# Patient Record
(demographics unavailable — no encounter records)

---

## 2024-12-15 NOTE — DISCUSSION/SUMMARY
[All Questions Answered] : Patient (and family) had all questions answered to an agreeable level of satisfaction [Interested in Proceeding] : Patient (and family) expressed understanding and interest in proceeding with the plan as outlined [de-identified] : There are two issues present. 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 It does not looks aggressive and we can follow this up in 3 months on X-ray as it is asymptomatic.  She also has pain and swelling in the dorsum of her foot. X-rays do not show any thing suggestive of a stress fracture; however, given her symptoms and that she has been having it for several months, I recommend obtaining an MRI of the area to evaluate for stress fracture or a mass in the foot.  Follow up after this.  If imaging or pathology/biopsy was ordered, the patient was told to make an appointment to review findings right after all imaging is completed.   We discussed risks, benefits and alternatives. Rationale of care was reviewed and all questions were answered.

## 2024-12-15 NOTE — DISCUSSION/SUMMARY
[All Questions Answered] : Patient (and family) had all questions answered to an agreeable level of satisfaction [Interested in Proceeding] : Patient (and family) expressed understanding and interest in proceeding with the plan as outlined [de-identified] : There are two issues present. 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 It does not looks aggressive and we can follow this up in 3 months on X-ray as it is asymptomatic.  She also has pain and swelling in the dorsum of her foot. X-rays do not show any thing suggestive of a stress fracture; however, given her symptoms and that she has been having it for several months, I recommend obtaining an MRI of the area to evaluate for stress fracture or a mass in the foot.  Follow up after this.  If imaging or pathology/biopsy was ordered, the patient was told to make an appointment to review findings right after all imaging is completed.   We discussed risks, benefits and alternatives. Rationale of care was reviewed and all questions were answered.

## 2024-12-15 NOTE — DATA REVIEWED
[Imaging Present] : Present [de-identified] : X-ray right foot 12/4/2024 IMPRESSION: - No acute fracture. 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 - Suggestion of hallux valgus on this nonweightbearing study. No dislocation. - Lisfranc space is congruent on this nonweightbearing study. - Joint spaces are grossly preserved. No osseous erosion or periosteal reaction. - No focal soft tissue abnormality

## 2024-12-15 NOTE — ADDENDUM
[FreeTextEntry1] : I, Chung Nolasco, documented this note as a scribe on behalf of Dr. John Villafana on 12/13/2024.

## 2024-12-15 NOTE — DATA REVIEWED
[Imaging Present] : Present [de-identified] : X-ray right foot 12/4/2024 IMPRESSION: - No acute fracture. 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 - Suggestion of hallux valgus on this nonweightbearing study. No dislocation. - Lisfranc space is congruent on this nonweightbearing study. - Joint spaces are grossly preserved. No osseous erosion or periosteal reaction. - No focal soft tissue abnormality

## 2024-12-15 NOTE — END OF VISIT
[FreeTextEntry3] : All medical record entries made by the Scribe were at my, Dr. John Villafana, direction and personally dictated by me on 12/13/2024. I have reviewed the chart and agree that the record accurately reflects my personal performance of the history, physical exam, assessment and plan. I have also personally directed, reviewed, and agreed with the chart.

## 2024-12-15 NOTE — PHYSICAL EXAM
[General Appearance - Well-Appearing] : Well appearing [General Appearance - Well Nourished] : well nourished [Oriented To Time, Place, And Person] : Oriented to person, place, and time [Sclera] : the sclera and conjunctiva were normal [Neck Cervical Mass (___cm)] : no neck mass was observed [Heart Rate And Rhythm] : heart rate was normal and rhythm regular [] : No respiratory distress [Abdomen Soft] : Soft [FreeTextEntry1] : On exam she has swelling at the midfoot over the TMT joint space at the 3rd and 4th toes. She has no pain with ROM of her ankle and no pain around the distal tibia. She has no skin lesions and is neurovascularly intact.

## 2024-12-15 NOTE — HISTORY OF PRESENT ILLNESS
[Worsening] : worsening [___ mths] : [unfilled] month(s) ago [5] : currently ~his/her~ pain is 5 out of 10 [Direct Pressure] : worsened by direct pressure [Running] : worsened by running [Walking] : worsened by walking [FreeTextEntry1] : This is a 17 year old female who presents with a few months of right foot pain once she starts running at the dorsum of the midfoot. This persists even when she stops, and now has been affecting her regular walking as well. She takes ibuprofen with some relief. She has minimal if any ankle pain. She occasionally stops running because of the pain. She went to pediatric [facility] but was sent here after a right ankle lesion was seen on X-rays.

## 2025-03-29 NOTE — DATA REVIEWED
[Imaging Present] : Present [de-identified] : X-rays today (03/28/2025), multiple views of the right ankle, show the 2 cm mildly sclerotic lesion along the lateral border of the distal tibial metadiaphysis, consistent with a healing nonossifying fibroma. This is unchanged from before.

## 2025-03-29 NOTE — PHYSICAL EXAM
[General Appearance - Well-Appearing] : Well appearing [General Appearance - Well Nourished] : well nourished [Sclera] : the sclera and conjunctiva were normal [Oriented To Time, Place, And Person] : Oriented to person, place, and time [Neck Cervical Mass (___cm)] : no neck mass was observed [Heart Rate And Rhythm] : heart rate was normal and rhythm regular [] : No respiratory distress [Abdomen Soft] : Soft [FreeTextEntry1] : On exam she has no tenderness anywhere in her right foot. She has good ROM of her ankle. She has no tenderness over the area at the lateral distal tibia. She is otherwise neurovascularly intact.

## 2025-03-29 NOTE — DISCUSSION/SUMMARY
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 This looks nonaggressive and unchanged. My recommendation is to follow this with repeat X-rays in 6 months. I would likely let her go after that as she is planning for college. I will see her again before she leaves.  If imaging or pathology/biopsy was ordered, the patient was told to make an appointment to review findings right after all imaging is completed.   We discussed risks, benefits and alternatives. Rationale of care was reviewed and all questions were answered.

## 2025-03-29 NOTE — PHYSICAL EXAM
[General Appearance - Well-Appearing] : Well appearing [General Appearance - Well Nourished] : well nourished [Oriented To Time, Place, And Person] : Oriented to person, place, and time [Sclera] : the sclera and conjunctiva were normal [Neck Cervical Mass (___cm)] : no neck mass was observed [Heart Rate And Rhythm] : heart rate was normal and rhythm regular [] : No respiratory distress [Abdomen Soft] : Soft [FreeTextEntry1] : On exam she has no tenderness anywhere in her right foot. She has good ROM of her ankle. She has no tenderness over the area at the lateral distal tibia. She is otherwise neurovascularly intact.

## 2025-03-29 NOTE — ADDENDUM
[FreeTextEntry1] : I, Chung Nolasco, documented this note as a scribe on behalf of Dr. John Villafana on 03/28/2025.

## 2025-03-29 NOTE — HISTORY OF PRESENT ILLNESS
[Improving] : improving [___ mths] : [unfilled] month(s) ago [0] : currently ~his/her~ pain is 0 out of 10 [FreeTextEntry1] : Patient had some pain in her right foot but never had an MRI, and since then her foot pain has resolved completely. She is able to do regular activities without any issues.

## 2025-03-29 NOTE — END OF VISIT
[FreeTextEntry3] : All medical record entries made by the Scribe were at my, Dr. John Villafana, direction and personally dictated by me on 03/28/2025. I have reviewed the chart and agree that the record accurately reflects my personal performance of the history, physical exam, assessment and plan. I have also personally directed, reviewed, and agreed with the chart.

## 2025-03-29 NOTE — DATA REVIEWED
[Imaging Present] : Present [de-identified] : X-rays today (03/28/2025), multiple views of the right ankle, show the 2 cm mildly sclerotic lesion along the lateral border of the distal tibial metadiaphysis, consistent with a healing nonossifying fibroma. This is unchanged from before.